# Patient Record
Sex: MALE | URBAN - METROPOLITAN AREA
[De-identification: names, ages, dates, MRNs, and addresses within clinical notes are randomized per-mention and may not be internally consistent; named-entity substitution may affect disease eponyms.]

---
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
## 2020-11-09 ENCOUNTER — OFFICE VISIT (OUTPATIENT)
Dept: URGENT CARE | Facility: CLINIC | Age: 20
End: 2020-11-09
Payer: COMMERCIAL

## 2020-11-09 VITALS
DIASTOLIC BLOOD PRESSURE: 80 MMHG | WEIGHT: 194 LBS | BODY MASS INDEX: 25.71 KG/M2 | OXYGEN SATURATION: 97 % | TEMPERATURE: 97.8 F | HEART RATE: 74 BPM | RESPIRATION RATE: 14 BRPM | HEIGHT: 73 IN | SYSTOLIC BLOOD PRESSURE: 122 MMHG

## 2020-11-09 DIAGNOSIS — R51.9 ACUTE NONINTRACTABLE HEADACHE, UNSPECIFIED HEADACHE TYPE: ICD-10-CM

## 2020-11-09 PROCEDURE — 99204 OFFICE O/P NEW MOD 45 MIN: CPT | Performed by: PHYSICIAN ASSISTANT

## 2020-11-09 RX ORDER — IBUPROFEN 600 MG/1
600 TABLET ORAL EVERY 6 HOURS PRN
Qty: 30 TAB | Refills: 0 | Status: SHIPPED | OUTPATIENT
Start: 2020-11-09

## 2020-11-09 ASSESSMENT — ENCOUNTER SYMPTOMS
SENSORY CHANGE: 0
CHILLS: 0
NAUSEA: 0
TREMORS: 0
SINUS PAIN: 0
VOMITING: 0
DIZZINESS: 0
SPEECH CHANGE: 0
WEAKNESS: 0
DOUBLE VISION: 0
BLURRED VISION: 0
TINGLING: 0
HEADACHES: 1
SEIZURES: 0
MYALGIAS: 0
FEVER: 0
LOSS OF CONSCIOUSNESS: 0
SORE THROAT: 0
NECK PAIN: 0
FOCAL WEAKNESS: 0

## 2020-11-10 NOTE — PROGRESS NOTES
Subjective:   Wolf Trent is a 20 y.o. male who presents for Sinus Problem (Sinus pressure and some congestion, runny nose that is clear and headache. No sore throat, shortness of breath, no chest pain, no n/v or diarrhea. x 5 days. )        Patient presents with his mother.  This is a new problem.  Patient states that he developed severe headache 5 days ago.  Pain was at the back of his head and did not radiate.  Mom states that patient refused to range his neck at the time, as it increased the pain in his head.  Mom states that he did not get out of bed for 2 days.  Patient states that he did wake up with headache this morning, but symptoms responded well to Advil.  However when he does not take this medication symptoms returned.  Patient states that he had a bit of nasal congestion but is not experiencing any sinus pressure.  He denies other cold and flulike symptoms such as body aches and sore throat.  No known exposure to COVID-19 or flu.  He denies changes in vision, diplopia, blurred vision, loss of balance, nausea, vomiting, fevers, chills, or other motor or sensory deficit.  No other aggravating or alleviating factors.    Review of Systems   Constitutional: Positive for malaise/fatigue. Negative for chills and fever.   HENT: Positive for congestion. Negative for ear pain, sinus pain, sore throat and tinnitus.    Eyes: Negative for blurred vision and double vision.   Gastrointestinal: Negative for nausea and vomiting.   Musculoskeletal: Negative for myalgias and neck pain.   Neurological: Positive for headaches. Negative for dizziness, tingling, tremors, sensory change, speech change, focal weakness, seizures, loss of consciousness and weakness.   All other systems reviewed and are negative.      PMH:  has no past medical history of Migraine.  MEDS:   Current Outpatient Medications:   •  ibuprofen (MOTRIN) 600 MG Tab, Take 1 Tab by mouth every 6 hours as needed., Disp: 30 Tab, Rfl: 0  ALLERGIES: No Known  "Allergies  SURGHX: History reviewed. No pertinent surgical history.  SOCHX:  reports that he has never smoked. He has never used smokeless tobacco.  FH: Family history was reviewed, no pertinent findings to report   Objective:   /80 (BP Location: Left arm, Patient Position: Sitting, BP Cuff Size: Adult)   Pulse 74   Temp 36.6 °C (97.8 °F) (Temporal)   Resp 14   Ht 1.854 m (6' 1\")   Wt 88 kg (194 lb)   SpO2 97%   BMI 25.60 kg/m²   Physical Exam  Vitals signs reviewed.   Constitutional:       General: He is not in acute distress.     Appearance: Normal appearance. He is well-developed. He is not toxic-appearing.   HENT:      Head: Normocephalic and atraumatic.      Right Ear: Tympanic membrane, ear canal and external ear normal.      Left Ear: Tympanic membrane, ear canal and external ear normal.      Nose: Nose normal. No congestion.      Right Sinus: No maxillary sinus tenderness or frontal sinus tenderness.      Left Sinus: No maxillary sinus tenderness or frontal sinus tenderness.      Mouth/Throat:      Lips: Pink.      Mouth: Mucous membranes are moist.      Pharynx: Oropharynx is clear. Uvula midline.   Eyes:      General: Gaze aligned appropriately.   Neck:      Musculoskeletal: Neck supple.   Cardiovascular:      Rate and Rhythm: Normal rate and regular rhythm.   Pulmonary:      Effort: Pulmonary effort is normal. No respiratory distress.      Breath sounds: No stridor.   Musculoskeletal:      Comments: Paracervicals and trapezius nontender to palpation, bilaterally.  No palpable spasm. Cervical ROM WNL. No midline tenderness, step-offs, deformities of cervical spine.   Skin:     General: Skin is warm and dry.      Capillary Refill: Capillary refill takes less than 2 seconds.   Neurological:      General: No focal deficit present.      Mental Status: He is alert and oriented to person, place, and time.      Comments: PERRLA, EOMI, bilaterally.  No nystagmus.  Cranial nerves II through XII intact. "  Upper and lower extremity strength 5 out of 5 bilaterally.  Upper and lower extremity sensation 5 out of 5 bilaterally.  Negative finger-to-nose testing.  Negative heel-to-shin testing.  Tandem walk is normal.   Psychiatric:         Speech: Speech normal.         Behavior: Behavior normal.           Assessment/Plan:   1. Acute nonintractable headache, unspecified headache type  - MR-BRAIN-W/O; Future  - REFERRAL TO NEUROLOGY  - ibuprofen (MOTRIN) 600 MG Tab; Take 1 Tab by mouth every 6 hours as needed.  Dispense: 30 Tab; Refill: 0    Reported severity of patient's symptoms over the weekend is concerning.  He is currently well-appearing and vital signs stable.  He is able to range his neck without difficulty.  Neurological examination is normal.  Patient denies past or present neurological symptoms.  I am not convinced that headache is associated with his congestion, but viral considerations discussed. This does not distinctly look like a tension headache to me. No evidence of CVA or meningitis on PE and clinical suspicion is low.  Patient's appearance and physical exam today are reassuring.  The fact that his current symptoms are responding well to NSAIDs is also reassuring.     Patient has not established with primary care.  Due to persistence and atypical nature of symptoms, I recommend imaging to further evaluate.  Patient referred to neurology for additional evaluation. I will contact pt with results upon receipt. Continue NSAIDs prn.    Red flag signs and symptoms discussed at length with patient and he was given educational handout.  If he experiences a recurrence of severe symptoms and neck pain I would like him to go to the emergency room for reevaluation.  Any neurological symptoms to the ER.    Differential diagnosis, natural history, supportive care, and indications for immediate follow-up discussed.